# Patient Record
Sex: MALE | Race: WHITE | NOT HISPANIC OR LATINO | Employment: FULL TIME | URBAN - NONMETROPOLITAN AREA
[De-identification: names, ages, dates, MRNs, and addresses within clinical notes are randomized per-mention and may not be internally consistent; named-entity substitution may affect disease eponyms.]

---

## 2021-11-12 ENCOUNTER — OFFICE VISIT (OUTPATIENT)
Dept: URGENT CARE | Facility: CLINIC | Age: 36
End: 2021-11-12
Payer: COMMERCIAL

## 2021-11-12 VITALS
BODY MASS INDEX: 28.77 KG/M2 | RESPIRATION RATE: 16 BRPM | OXYGEN SATURATION: 97 % | WEIGHT: 205.5 LBS | HEART RATE: 106 BPM | SYSTOLIC BLOOD PRESSURE: 112 MMHG | TEMPERATURE: 99.3 F | HEIGHT: 71 IN | DIASTOLIC BLOOD PRESSURE: 70 MMHG

## 2021-11-12 DIAGNOSIS — J06.9 URI, ACUTE: ICD-10-CM

## 2021-11-12 PROCEDURE — 99203 OFFICE O/P NEW LOW 30 MIN: CPT | Performed by: NURSE PRACTITIONER

## 2021-11-12 RX ORDER — AMOXICILLIN AND CLAVULANATE POTASSIUM 875; 125 MG/1; MG/1
1 TABLET, FILM COATED ORAL 2 TIMES DAILY
Qty: 14 TABLET | Refills: 0 | Status: SHIPPED | OUTPATIENT
Start: 2021-11-12 | End: 2021-11-19

## 2021-11-12 RX ORDER — IBUPROFEN 600 MG/1
600 TABLET ORAL EVERY 6 HOURS PRN
Qty: 20 TABLET | Refills: 0 | Status: SHIPPED | OUTPATIENT
Start: 2021-11-12 | End: 2022-10-10

## 2021-11-12 ASSESSMENT — ENCOUNTER SYMPTOMS
SINUS PRESSURE: 1
SINUS PAIN: 1

## 2021-11-12 NOTE — PROGRESS NOTES
Subjective     Flo Shields is a 36 y.o. male who presents with Sinus Problem (x 4 days)    No past medical history on file.  Social History     Socioeconomic History   • Marital status: Single     Spouse name: Not on file   • Number of children: Not on file   • Years of education: Not on file   • Highest education level: Not on file   Occupational History   • Not on file   Tobacco Use   • Smoking status: Never Smoker   • Smokeless tobacco: Never Used   Substance and Sexual Activity   • Alcohol use: Not Currently   • Drug use: Never   • Sexual activity: Not on file   Other Topics Concern   • Not on file   Social History Narrative   • Not on file     Social Determinants of Health     Financial Resource Strain:    • Difficulty of Paying Living Expenses: Not on file   Food Insecurity:    • Worried About Running Out of Food in the Last Year: Not on file   • Ran Out of Food in the Last Year: Not on file   Transportation Needs:    • Lack of Transportation (Medical): Not on file   • Lack of Transportation (Non-Medical): Not on file   Physical Activity:    • Days of Exercise per Week: Not on file   • Minutes of Exercise per Session: Not on file   Stress:    • Feeling of Stress : Not on file   Social Connections:    • Frequency of Communication with Friends and Family: Not on file   • Frequency of Social Gatherings with Friends and Family: Not on file   • Attends Hinduism Services: Not on file   • Active Member of Clubs or Organizations: Not on file   • Attends Club or Organization Meetings: Not on file   • Marital Status: Not on file   Intimate Partner Violence:    • Fear of Current or Ex-Partner: Not on file   • Emotionally Abused: Not on file   • Physically Abused: Not on file   • Sexually Abused: Not on file   Housing Stability:    • Unable to Pay for Housing in the Last Year: Not on file   • Number of Places Lived in the Last Year: Not on file   • Unstable Housing in the Last Year: Not on file     No family  "history on file.    Allergies: Minocycline hcl    C/o sinus pain, pressure and drainage over the last week.  Yellow in color. States positive low fever of 100.3. one episode of vomiting mucus. No SOB.  States he has not been vaccinated for Covid, and refuses Covid test today.          Sinus Problem  This is a new problem. The current episode started in the past 7 days. The problem is unchanged. There has been no fever. Associated symptoms include congestion and sinus pressure. Past treatments include nothing. The treatment provided no relief.       Review of Systems   HENT: Positive for congestion, sinus pressure and sinus pain.    All other systems reviewed and are negative.             Objective     /70   Pulse (!) 106   Temp 37.4 °C (99.3 °F) (Temporal)   Resp 16   Ht 1.803 m (5' 11\")   Wt 93.2 kg (205 lb 8 oz)   SpO2 97%   BMI 28.66 kg/m²      Physical Exam  Vitals reviewed.   Constitutional:       Appearance: Normal appearance.   HENT:      Head: Normocephalic and atraumatic.      Right Ear: Tympanic membrane, ear canal and external ear normal.      Left Ear: Tympanic membrane, ear canal and external ear normal.      Nose: Congestion present.      Comments: Scant amount of yellow postnasal drainage     Mouth/Throat:      Mouth: Mucous membranes are moist.   Eyes:      Extraocular Movements: Extraocular movements intact.      Conjunctiva/sclera: Conjunctivae normal.      Pupils: Pupils are equal, round, and reactive to light.   Cardiovascular:      Rate and Rhythm: Normal rate and regular rhythm.      Heart sounds: Normal heart sounds.   Pulmonary:      Effort: Pulmonary effort is normal.      Breath sounds: Normal breath sounds.   Musculoskeletal:         General: Normal range of motion.      Cervical back: Normal range of motion and neck supple.   Skin:     General: Skin is warm.   Neurological:      Mental Status: He is alert and oriented to person, place, and time.   Psychiatric:         Mood " and Affect: Mood normal.         Behavior: Behavior normal.                             Assessment & Plan   Upper respiratory infection    Refuses Covid test  Augmentin  Ibuprofen  Over-the-counter nasal steroid spray of choice  Follow-up for persistent or worsening of symptoms     There are no diagnoses linked to this encounter.

## 2021-11-18 ENCOUNTER — TELEPHONE (OUTPATIENT)
Dept: URGENT CARE | Facility: CLINIC | Age: 36
End: 2021-11-18

## 2021-11-18 NOTE — TELEPHONE ENCOUNTER
He doesn't feel better was wondering if you could refill his Augmentin [phone] again or if you could call him and him an advice. Thank you

## 2022-09-25 ENCOUNTER — HOSPITAL ENCOUNTER (EMERGENCY)
Facility: MEDICAL CENTER | Age: 37
End: 2022-09-25
Attending: STUDENT IN AN ORGANIZED HEALTH CARE EDUCATION/TRAINING PROGRAM
Payer: COMMERCIAL

## 2022-09-25 VITALS
HEART RATE: 82 BPM | SYSTOLIC BLOOD PRESSURE: 142 MMHG | TEMPERATURE: 97.8 F | OXYGEN SATURATION: 98 % | HEIGHT: 71 IN | BODY MASS INDEX: 30.56 KG/M2 | DIASTOLIC BLOOD PRESSURE: 86 MMHG | RESPIRATION RATE: 18 BRPM | WEIGHT: 218.26 LBS

## 2022-09-25 DIAGNOSIS — S61.011A THUMB LACERATION, RIGHT, INITIAL ENCOUNTER: ICD-10-CM

## 2022-09-25 PROCEDURE — 90471 IMMUNIZATION ADMIN: CPT

## 2022-09-25 PROCEDURE — 700111 HCHG RX REV CODE 636 W/ 250 OVERRIDE (IP): Performed by: STUDENT IN AN ORGANIZED HEALTH CARE EDUCATION/TRAINING PROGRAM

## 2022-09-25 PROCEDURE — 303747 HCHG EXTRA SUTURE

## 2022-09-25 PROCEDURE — 304999 HCHG REPAIR-SIMPLE/INTERMED LEVEL 1

## 2022-09-25 PROCEDURE — 304217 HCHG IRRIGATION SYSTEM

## 2022-09-25 PROCEDURE — A9270 NON-COVERED ITEM OR SERVICE: HCPCS | Performed by: STUDENT IN AN ORGANIZED HEALTH CARE EDUCATION/TRAINING PROGRAM

## 2022-09-25 PROCEDURE — 700102 HCHG RX REV CODE 250 W/ 637 OVERRIDE(OP): Performed by: STUDENT IN AN ORGANIZED HEALTH CARE EDUCATION/TRAINING PROGRAM

## 2022-09-25 PROCEDURE — 99283 EMERGENCY DEPT VISIT LOW MDM: CPT

## 2022-09-25 PROCEDURE — 90715 TDAP VACCINE 7 YRS/> IM: CPT | Performed by: STUDENT IN AN ORGANIZED HEALTH CARE EDUCATION/TRAINING PROGRAM

## 2022-09-25 RX ORDER — IBUPROFEN 600 MG/1
600 TABLET ORAL ONCE
Status: COMPLETED | OUTPATIENT
Start: 2022-09-25 | End: 2022-09-25

## 2022-09-25 RX ORDER — BUPIVACAINE HYDROCHLORIDE 2.5 MG/ML
10 INJECTION, SOLUTION EPIDURAL; INFILTRATION; INTRACAUDAL ONCE
Status: COMPLETED | OUTPATIENT
Start: 2022-09-25 | End: 2022-09-25

## 2022-09-25 RX ADMIN — BUPIVACAINE HYDROCHLORIDE 10 ML: 2.5 INJECTION, SOLUTION EPIDURAL; INFILTRATION; INTRACAUDAL; PERINEURAL at 01:40

## 2022-09-25 RX ADMIN — CLOSTRIDIUM TETANI TOXOID ANTIGEN (FORMALDEHYDE INACTIVATED), CORYNEBACTERIUM DIPHTHERIAE TOXOID ANTIGEN (FORMALDEHYDE INACTIVATED), BORDETELLA PERTUSSIS TOXOID ANTIGEN (GLUTARALDEHYDE INACTIVATED), BORDETELLA PERTUSSIS FILAMENTOUS HEMAGGLUTININ ANTIGEN (FORMALDEHYDE INACTIVATED), BORDETELLA PERTUSSIS PERTACTIN ANTIGEN, AND BORDETELLA PERTUSSIS FIMBRIAE 2/3 ANTIGEN 0.5 ML: 5; 2; 2.5; 5; 3; 5 INJECTION, SUSPENSION INTRAMUSCULAR at 01:37

## 2022-09-25 RX ADMIN — IBUPROFEN 600 MG: 600 TABLET, FILM COATED ORAL at 02:30

## 2022-09-25 NOTE — ED TRIAGE NOTES
"Chief Complaint   Patient presents with    Hand Laceration     Pt cut right thumb on glass this evening and came to ED due to continued bleeding. Laceration is 2cm x2cm on two sides coming to a point. bleeding without pressure. Tetanus <10yrs since last.      BP (!) 160/95   Pulse (!) 103   Temp 36.4 °C (97.6 °F) (Temporal)   Resp 18   Ht 1.803 m (5' 11\")   Wt 99 kg (218 lb 4.1 oz)   SpO2 97%   BMI 30.44 kg/m²    "

## 2022-09-25 NOTE — ED NOTES
Right thumb irrigated with 1000mL sterile water. Pt numbed by ERP prior to irrigation. No pain felt during procedure.

## 2022-09-25 NOTE — DISCHARGE INSTRUCTIONS
Your sutures will need to be removed in 12 to 14 days.  This can be done here at the emergency department, urgent care, or a primary care doctor's office.  Return the emergency department if any signs of infection develop.    Take the following medications for pain/fever at home:  Acetaminophen (Tylenol): Take 650 mg (2 regular strength) every 6 hours. Do not take more than 3,000mg in a 24 hour period.   Ibuprofen: Take 400-600 mg (2-3 regular strength) every 6 hours. Take with food.   Alternate the two medications and you can take one of them every 3 hours.

## 2022-09-25 NOTE — ED PROVIDER NOTES
"ED Provider Note    CHIEF COMPLAINT  Chief Complaint   Patient presents with    Hand Laceration     Pt cut right thumb on glass this evening and came to ED due to continued bleeding. Laceration is 2cm x2cm on two sides coming to a point. bleeding without pressure. Tetanus <10yrs since last.        HPI  Flo Shields is a 37 y.o. male who presents with laceration to his right thumb.  Patient broke a glass tonight and cut it on glass.  Patient is not sure when his last tetanus was.  He denies any numbness or tingling.  No other injuries.    REVIEW OF SYSTEMS  See HPI for further details. All other systems are negative.     PAST MEDICAL HISTORY  No chronic medical problems, no allergies    SOCIAL HISTORY  Social History     Tobacco Use    Smoking status: Never    Smokeless tobacco: Never   Substance and Sexual Activity    Alcohol use: Not Currently    Drug use: Never    Sexual activity: Not on file       SURGICAL HISTORY  patient denies any surgical history    CURRENT MEDICATIONS  Home Medications       Reviewed by Mike Champagne R.N. (Registered Nurse) on 09/25/22 at 0128  Med List Status: Not Addressed     Medication Last Dose Status   ibuprofen (MOTRIN) 600 MG Tab  Active                    ALLERGIES  Allergies   Allergen Reactions    Minocycline Hcl        PHYSICAL EXAM  VITAL SIGNS: BP (!) 160/95   Pulse (!) 103   Temp 36.4 °C (97.6 °F) (Temporal)   Resp 18   Ht 1.803 m (5' 11\")   Wt 99 kg (218 lb 4.1 oz)   SpO2 97%   BMI 30.44 kg/m²    Pulse ox interpretation: I interpret this pulse ox as normal.  Constitutional: Alert in no apparent distress.  HENT: Normocephalic, Atraumatic, Bilateral external ears normal. Nose normal.   Eyes: Pupils are equal and reactive. Conjunctiva normal, non-icteric.   Heart: Regular rate and rythm, no murmurs.    Lungs: Clear to auscultation bilaterally.  Skin: Warm, Dry, No erythema, No rash.   MSK: Right thumb with V-shaped laceration on the radial aspect 2 x 2 " centimeter, intact flexion and extension of the, normal sensation, 2+ radial pulse  Neurologic: Alert, Grossly non-focal.   Psychiatric: Affect normal, Judgment normal, Mood normal, Appears appropriate and not intoxicated.           COURSE & MEDICAL DECISION MAKING  Pertinent Labs & Imaging studies reviewed. (See chart for details)    2:26 AM  Laceration Repair Procedure    Indication: Laceration    Location/Description: right thumb    Procedure: The patient was placed in the appropriate position and anesthesia around the laceration was obtained with a full digital block of the right thumb using 0.5% Bupivacaine without epinephrine. The area was then irrigated with normal saline. The laceration was closed with 5-0 Ethilon using interrupted sutures. There were no additional lacerations requiring repair. The wound area was then dressed with a sterile dressing.      Total repaired wound length: 6 cm.     Other Items: Suture count: 12    The patient tolerated the procedure well.    Complications: None      Patient presents with laceration to right thumb. No evidence of foreign body.  No evidence of tendon, ligament, or nerve injury.  Perfusion intact.  Irrigated thoroughly. Wound closed with sutures. Tetanus was updated. No other wounds/injuries. Discharged home with return precautions.       The patient will not drink alcohol nor drive with prescribed medications. The patient will return for worsening symptoms and is stable at the time of discharge. The patient verbalizes understanding and will comply.    FINAL IMPRESSION  1. Thumb laceration, right, initial encounter                 Electronically signed by: Patricia Petersen M.D., 9/25/2022 1:34 AM

## 2022-09-25 NOTE — ED NOTES
Patient presents to ED for a right sided thumb injury that the patient states he sustained 30 mins before ED arrival

## 2022-10-10 ENCOUNTER — OFFICE VISIT (OUTPATIENT)
Dept: URGENT CARE | Facility: CLINIC | Age: 37
End: 2022-10-10
Payer: COMMERCIAL

## 2022-10-10 VITALS
BODY MASS INDEX: 30.52 KG/M2 | OXYGEN SATURATION: 100 % | RESPIRATION RATE: 20 BRPM | HEIGHT: 71 IN | HEART RATE: 78 BPM | SYSTOLIC BLOOD PRESSURE: 116 MMHG | WEIGHT: 218 LBS | TEMPERATURE: 97.5 F | DIASTOLIC BLOOD PRESSURE: 70 MMHG

## 2022-10-10 DIAGNOSIS — Z48.02 VISIT FOR SUTURE REMOVAL: ICD-10-CM

## 2022-10-10 PROCEDURE — 99212 OFFICE O/P EST SF 10 MIN: CPT | Performed by: STUDENT IN AN ORGANIZED HEALTH CARE EDUCATION/TRAINING PROGRAM

## 2022-10-10 NOTE — PROGRESS NOTES
"Subjective:   Flo Shields is a 37 y.o. male who presents for Suture / Staple Removal (Lv: 09/25/22, Rt thumb, 12 sutures placed, 12 sutures found )      HPI:  Pleasant 37-year-old male presents to clinic for suture removal to his right thumb following laceration on 9/25/2022.  He was originally seen at [facility] for laceration repair.  He does report some continued mild swelling of the thumb and mild tingling to the tip of the thumb.  The tingling does come and go.  No increased redness, drainage, pus, fever, chills, reduced range of motion, burning, radiation of pain into the hand, reduced strength, or headache.  He has been keeping the area clean and covered.  He has been following his wound care as previously instructed.  His tetanus was updated at the ER.    Medications:    This patient does not have an active medication from one of the medication groupers.    Allergies: Minocycline hcl    Problem List: Flo Shields does not have a problem list on file.    Surgical History:  No past surgical history on file.    Past Social Hx: Flo Shields  reports that he has never smoked. He has never used smokeless tobacco. He reports that he does not currently use alcohol. He reports that he does not use drugs.     Past Family Hx:  Flo Shields family history is not on file.     Problem list, medications, and allergies reviewed by myself today in Epic.     Objective:     /70 (BP Location: Left arm, Patient Position: Sitting, BP Cuff Size: Adult long)   Pulse 78   Temp 36.4 °C (97.5 °F) (Temporal)   Resp 20   Ht 1.803 m (5' 11\")   Wt 98.9 kg (218 lb)   SpO2 100%   BMI 30.40 kg/m²     Physical Exam  Vitals reviewed.   Constitutional:       Appearance: Normal appearance.   HENT:      Mouth/Throat:      Mouth: Mucous membranes are moist.   Eyes:      Pupils: Pupils are equal, round, and reactive to light.   Cardiovascular:      Rate and Rhythm: Normal rate and regular rhythm. "   Skin:     General: Skin is warm and dry.      Capillary Refill: Capillary refill takes less than 2 seconds.      Findings: No erythema, lesion or rash.      Comments: Right thumb: V-shaped laceration present to the right thumb with good approximation and 12 intact simple interrupted sutures.  No surrounding erythema, fluctuance, induration, drainage, pus, or signs of infection.  Full active and passive range of motion following suture removal.  Sensation intact.  Cap refill less than 2 seconds.  2+ radial pulse.  Scab overlying laceration.  Mild amount of diffuse swelling present at the thumb.   Neurological:      General: No focal deficit present.      Mental Status: He is alert and oriented to person, place, and time.       Assessment/Plan:     Diagnosis and associated orders:     1. Visit for suture removal           Comments/MDM:     12 simple interrupted sutures were removed without complication.  No signs of tendon or ligamentous involvement at this time.  Patient does have a mild amount of tingling in the tip of his thumb that I do believe is due to swelling of the thumb itself.  No signs of infection at this time.  Wound is healing appropriately.  Continue with wound care and keeping it covered.  Do not soak in public pools or hot tubs.  ED/return precautions were given.  Patient is neurovascularly intact.         Differential diagnosis, natural history, supportive care, and indications for immediate follow-up discussed.    Advised the patient to follow-up with the primary care physician for recheck, reevaluation, and consideration of further management.    Please note that this dictation was created using voice recognition software. I have made a reasonable attempt to correct obvious errors, but I expect that there are errors of grammar and possibly content that I did not discover before finalizing the note.    Electronically signed by Beni Cochran PA-C.